# Patient Record
Sex: FEMALE | Race: WHITE | NOT HISPANIC OR LATINO | Employment: FULL TIME | URBAN - METROPOLITAN AREA
[De-identification: names, ages, dates, MRNs, and addresses within clinical notes are randomized per-mention and may not be internally consistent; named-entity substitution may affect disease eponyms.]

---

## 2017-09-11 ENCOUNTER — HOSPITAL ENCOUNTER (OUTPATIENT)
Dept: CARDIOLOGY | Facility: MEDICAL CENTER | Age: 57
End: 2017-09-11
Attending: SPECIALIST
Payer: COMMERCIAL

## 2017-09-11 DIAGNOSIS — I77.810 AORTIC ROOT DILATION (HCC): ICD-10-CM

## 2017-09-11 DIAGNOSIS — Q87.40 MARFANOID INTELLECTUAL DISABILITY SYNDROME: ICD-10-CM

## 2017-09-11 DIAGNOSIS — F78.A9 MARFANOID INTELLECTUAL DISABILITY SYNDROME: ICD-10-CM

## 2017-09-11 LAB
LV EJECT FRACT  99904: 65
LV EJECT FRACT MOD 2C 99903: 75.19
LV EJECT FRACT MOD 4C 99902: 92.94
LV EJECT FRACT MOD BP 99901: 86.88

## 2017-09-11 PROCEDURE — 93306 TTE W/DOPPLER COMPLETE: CPT

## 2017-09-11 PROCEDURE — 93306 TTE W/DOPPLER COMPLETE: CPT | Mod: 26 | Performed by: INTERNAL MEDICINE

## 2017-11-20 ENCOUNTER — HOSPITAL ENCOUNTER (OUTPATIENT)
Dept: RADIOLOGY | Facility: MEDICAL CENTER | Age: 57
End: 2017-11-20
Attending: NURSE PRACTITIONER
Payer: COMMERCIAL

## 2017-11-20 DIAGNOSIS — R91.8 PULMONARY NODULES: ICD-10-CM

## 2017-11-20 PROCEDURE — 71250 CT THORAX DX C-: CPT

## 2018-01-22 ENCOUNTER — APPOINTMENT (OUTPATIENT)
Dept: PULMONOLOGY | Facility: HOSPICE | Age: 58
End: 2018-01-22
Payer: COMMERCIAL

## 2018-03-10 ENCOUNTER — HOSPITAL ENCOUNTER (OUTPATIENT)
Dept: RADIOLOGY | Facility: MEDICAL CENTER | Age: 58
End: 2018-03-10
Attending: FAMILY MEDICINE
Payer: COMMERCIAL

## 2018-03-10 ENCOUNTER — OFFICE VISIT (OUTPATIENT)
Dept: URGENT CARE | Facility: PHYSICIAN GROUP | Age: 58
End: 2018-03-10
Payer: COMMERCIAL

## 2018-03-10 VITALS
HEART RATE: 86 BPM | BODY MASS INDEX: 21.22 KG/M2 | OXYGEN SATURATION: 98 % | HEIGHT: 68 IN | DIASTOLIC BLOOD PRESSURE: 86 MMHG | WEIGHT: 140 LBS | SYSTOLIC BLOOD PRESSURE: 132 MMHG

## 2018-03-10 DIAGNOSIS — S92.355A CLOSED NONDISPLACED FRACTURE OF FIFTH METATARSAL BONE OF LEFT FOOT, INITIAL ENCOUNTER: ICD-10-CM

## 2018-03-10 DIAGNOSIS — S99.922A FOOT INJURY, LEFT, INITIAL ENCOUNTER: ICD-10-CM

## 2018-03-10 PROCEDURE — 99202 OFFICE O/P NEW SF 15 MIN: CPT | Performed by: FAMILY MEDICINE

## 2018-03-10 PROCEDURE — 73630 X-RAY EXAM OF FOOT: CPT | Mod: LT

## 2018-03-10 NOTE — PROGRESS NOTES
"Subjective:      Carolina Grider is a 57 y.o. female who presents with Ankle Injury (left ankle)            Onset yesterday inversion injury left foot with pain lateral aspect. Unable to bear weight on fore and midfoot. No prior injury. No ankle pain. Min relief with OTC nsaid and ice. No other aggravating or alleviating factors.          Review of Systems   Musculoskeletal: Negative for joint pain.   Skin:        No abrasion or laceration     Neurological: Negative for sensory change and focal weakness.          Objective:     /86   Pulse 86   Ht 1.727 m (5' 8\")   Wt 63.5 kg (140 lb)   SpO2 98%   BMI 21.29 kg/m²      Physical Exam   Constitutional: She appears well-developed and well-nourished. No distress.   HENT:   Head: Normocephalic and atraumatic.   Musculoskeletal:   Left foot: tender 5th MT without deformity. Skin intact.   Distal neuro/vascular intact.      Skin: Skin is warm and dry. No rash noted.               Assessment/Plan:     Xray: proximal L 5th MT fracture, xray discussed with radiology.     1. Foot injury, left, initial encounter  DX-FOOT-COMPLETE 3+ LEFT   2. Closed nondisplaced fracture of fifth metatarsal bone of left foot, initial encounter  REFERRAL TO SPORTS MEDICINE     Differential diagnosis, natural history, supportive care, and indications for immediate follow-up discussed at length.     Walking boot  Continue ice/nsaid as needed  "

## 2018-03-13 ENCOUNTER — OFFICE VISIT (OUTPATIENT)
Dept: MEDICAL GROUP | Facility: CLINIC | Age: 58
End: 2018-03-13
Payer: COMMERCIAL

## 2018-03-13 VITALS
TEMPERATURE: 98.4 F | SYSTOLIC BLOOD PRESSURE: 126 MMHG | BODY MASS INDEX: 21.22 KG/M2 | RESPIRATION RATE: 18 BRPM | HEIGHT: 68 IN | HEART RATE: 90 BPM | OXYGEN SATURATION: 98 % | WEIGHT: 140 LBS | DIASTOLIC BLOOD PRESSURE: 84 MMHG

## 2018-03-13 DIAGNOSIS — M79.672 LEFT FOOT PAIN: ICD-10-CM

## 2018-03-13 DIAGNOSIS — S92.355A NONDISPLACED FRACTURE OF FIFTH METATARSAL BONE, LEFT FOOT, INITIAL ENCOUNTER FOR CLOSED FRACTURE: ICD-10-CM

## 2018-03-13 PROCEDURE — 99202 OFFICE O/P NEW SF 15 MIN: CPT | Mod: 25 | Performed by: FAMILY MEDICINE

## 2018-03-13 PROCEDURE — 28470 CLTX METATARSAL FX WO MNP EA: CPT | Mod: LT | Performed by: FAMILY MEDICINE

## 2018-03-13 ASSESSMENT — ENCOUNTER SYMPTOMS
FEVER: 0
NAUSEA: 1
HEADACHES: 0
VOMITING: 0
DIZZINESS: 0
SHORTNESS OF BREATH: 0
CHILLS: 0

## 2018-03-13 NOTE — PROGRESS NOTES
Subjective:      Carolina Grider is a 57 y.o. female who presents with Foot Problem (Referral from / L foot toe injury )      Referred by Marques Campo M.D. for evaluation of LEFT lateral foot pain    HPI   LEFT lateral foot pain  Date of injury March 10, 2018  Tripped on vacuum cord, causing her to stumble and hyperflexed the LEFT foot   She was wearing gym shoes at the time  Felt a snap, heard a snap  Sudden sharp pain at the lateral LEFT forefoot    No radiation  Sharp with certain movements  Improved with rest/immobilization  Taking Tylenol for pain which is helping  Seen in urgent care, had x-rays and placed in a short cam walker boot  Denies any prior issues with her LEFT foot    Review of Systems   Constitutional: Negative for chills and fever.   Respiratory: Negative for shortness of breath.    Cardiovascular: Negative for chest pain.   Gastrointestinal: Positive for nausea. Negative for vomiting.        After the injury, due to pain, still some nausea this a.m.   Neurological: Negative for dizziness and headaches.     PMH:  has a past medical history of Bronchitis; Fibromyalgia; and Pneumonia.  MEDS:   Current Outpatient Prescriptions:   •  azithromycin (ZITHROMAX) 250 MG Tab, Take as directed, Disp: 6 Tab, Rfl: 0  •  ertapenem (INVANZ) 1 GM Recon Soln, 1,000 mg by Intravenous route., Disp: , Rfl:   •  lactobacillus granules (LACTINEX/FLORANEX) Pack, Take 1 Packet by mouth 2 Times a Day., Disp: 60 Packet, Rfl: 3  •  meloxicam (MOBIC) 15 MG tablet, Take 15 mg by mouth every bedtime., Disp: , Rfl:   •  cyclobenzaprine (FLEXERIL) 10 MG TABS, Take 10 mg by mouth every bedtime., Disp: , Rfl:   ALLERGIES:   Allergies   Allergen Reactions   • Codeine Hives, Itching and Swelling     SURGHX:   Past Surgical History:   Procedure Laterality Date   • HYSTERECTOMY, VAGINAL     • PB C-SEC ONLY,PBEV C-SEC       SOCHX:  reports that she has never smoked. She has never used smokeless tobacco. She reports that she drinks  "about 0.6 - 1.8 oz of alcohol per week . She reports that she does not use drugs.  FH: Family history was reviewed, no pertinent findings to report       Objective:     /84   Pulse 90   Temp 36.9 °C (98.4 °F)   Resp 18   Ht 1.727 m (5' 8\")   Wt 63.5 kg (140 lb)   SpO2 98%   BMI 21.29 kg/m²       Physical Exam      RIGHT ANKLE:  There is NO swelling noted at the ankle  Range of motion intact with dorsiflexion and plantarflexion, inversion and eversion  There is NO tenderness of the ATFL, CF or PT of ligament  There is NO tenderness of the lateral malleolus or medial malleolus  Anterior drawer testing is NEGATIVE  Talar tilt testing is NEGATIVE  The foot and ankle is otherwise neurovascularly intact    RIGHT FOOT:  There is NO swelling noted at the foot  There is NO tenderness at the base of the fifth metatarsal, cuboid, or tarsal navicular  There is NO pain with metatarsal squeeze test    LEFT ANKLE:  There is NO swelling noted at the ankle  Range of motion intact with dorsiflexion and plantarflexion, inversion and eversion  There is NO tenderness of the ATFL, CF or PT of ligament  There is NO tenderness of the lateral malleolus or medial malleolus  Anterior drawer testing is NEGATIVE  Talar tilt testing is NEGATIVE  The foot and ankle is otherwise neurovascularly intact    LEFT FOOT:  There is POSITIVE MILD to moderate swelling noted at the foot  There is POSITIVE tenderness at the base of the fifth metatarsal, without tenderness of the cuboid or tarsal navicular  There is NO pain with metatarsal squeeze test  POSITIVE tenderness at the proximal phalanx of the fourth toe and distal fourth metatarsal at the dorsal aspect    NEUTRAL stance  Able to ambulate with ANTALGIC gait       Assessment/Plan:     1. Nondisplaced fracture of fifth metatarsal bone, left foot, initial encounter for closed fracture     2. Left foot pain      forefoot at 4th metatarsal head and proximal phalanx of the 4th toe "     Continue fracture immobilization in Cam Walker boot  follow-up in 2 weeks for reevaluation prior to her trip to [place]    The plan is to continue Cam Walker boot for a total of 4-6 weeks (around April 17, 2018)          3/10/2018 2:26 PM    HISTORY/REASON FOR EXAM:  Pain/Deformity Following Trauma  Left foot pain, injury    TECHNIQUE/EXAM DESCRIPTION AND NUMBER OF VIEWS:  3 views of the LEFT foot.    COMPARISON:  None.    FINDINGS:    There is fracture of the second proximal phalanx although this could be old.    There is no malalignment.    Midfoot and forefoot degenerative changes are present.    There is an accessory navicular, anatomic variant   Impression       1.  Second proximal phalanx fracture, age indeterminate    2.  Midfoot and forefoot osteoarthritis     Interpreted in the office today with the patient  Nondisplaced base of fifth metatarsal fracture not mentioned on the official report    Thank you Marques Campo M.D. for allowing me to participate in caring for your patient.

## 2018-03-21 ASSESSMENT — ENCOUNTER SYMPTOMS
ROS SKIN COMMENTS: NO ABRASION OR LACERATION
FOCAL WEAKNESS: 0
SENSORY CHANGE: 0

## 2018-03-26 ENCOUNTER — OFFICE VISIT (OUTPATIENT)
Dept: MEDICAL GROUP | Facility: CLINIC | Age: 58
End: 2018-03-26
Payer: COMMERCIAL

## 2018-03-26 VITALS
RESPIRATION RATE: 18 BRPM | DIASTOLIC BLOOD PRESSURE: 82 MMHG | SYSTOLIC BLOOD PRESSURE: 122 MMHG | TEMPERATURE: 98.2 F | OXYGEN SATURATION: 98 % | HEART RATE: 82 BPM | HEIGHT: 68 IN | BODY MASS INDEX: 21.22 KG/M2 | WEIGHT: 140 LBS

## 2018-03-26 DIAGNOSIS — S92.352A: ICD-10-CM

## 2018-03-26 PROCEDURE — 99024 POSTOP FOLLOW-UP VISIT: CPT | Performed by: FAMILY MEDICINE

## 2018-03-26 ASSESSMENT — ENCOUNTER SYMPTOMS
SHORTNESS OF BREATH: 0
HEADACHES: 0
FEVER: 0
DIZZINESS: 0
NAUSEA: 1
VOMITING: 0
CHILLS: 0

## 2018-03-26 NOTE — LETTER
March 26, 2018         Patient: Carolina Grider   YOB: 1960   Date of Visit: 3/26/2018           To Whom it May Concern:    Carolina Grider was seen in my clinic on 3/26/2018. She is cleared for travel without restriction with her orthopedic boot.    If you have any questions or concerns, please don't hesitate to call.        Sincerely,           Hector Alatorre M.D.  Electronically Signed

## 2018-03-26 NOTE — PROGRESS NOTES
Subjective:      Carolina Grider is a 57 y.o. female who presents with Toe Injury (F/V L foot toe injury )      FOLLOW up for LEFT base of 5th metatarsal fracture    Toe Injury   Associated symptoms include nausea. Pertinent negatives include no chest pain, chills, fever, headaches or vomiting.      LEFT base of 5th metatarsal fracture  ZONE 1   Date of injury March 10, 2018  Tripped on vacuum cord, causing her to stumble and hyperflexed the LEFT foot   She was wearing gym shoes at the time  Felt a snap, heard a snap  Occasional PAIN with mis-steps    Review of Systems   Constitutional: Negative for chills and fever.   Respiratory: Negative for shortness of breath.    Cardiovascular: Negative for chest pain.   Gastrointestinal: Positive for nausea. Negative for vomiting.        After the injury, due to pain, still some nausea this a.m.   Neurological: Negative for dizziness and headaches.     PMH:  has a past medical history of Bronchitis; Fibromyalgia; and Pneumonia.  MEDS:   Current Outpatient Prescriptions:   •  azithromycin (ZITHROMAX) 250 MG Tab, Take as directed, Disp: 6 Tab, Rfl: 0  •  ertapenem (INVANZ) 1 GM Recon Soln, 1,000 mg by Intravenous route., Disp: , Rfl:   •  lactobacillus granules (LACTINEX/FLORANEX) Pack, Take 1 Packet by mouth 2 Times a Day., Disp: 60 Packet, Rfl: 3  •  meloxicam (MOBIC) 15 MG tablet, Take 15 mg by mouth every bedtime., Disp: , Rfl:   •  cyclobenzaprine (FLEXERIL) 10 MG TABS, Take 10 mg by mouth every bedtime., Disp: , Rfl:   ALLERGIES:   Allergies   Allergen Reactions   • Codeine Hives, Itching and Swelling     SURGHX:   Past Surgical History:   Procedure Laterality Date   • HYSTERECTOMY, VAGINAL     • PB C-SEC ONLY,PBEV C-SEC       SOCHX:  reports that she has never smoked. She has never used smokeless tobacco. She reports that she drinks about 0.6 - 1.8 oz of alcohol per week . She reports that she does not use drugs.  FH: Family history was reviewed, no pertinent findings to  "report       Objective:     /82   Pulse 82   Temp 36.8 °C (98.2 °F)   Resp 18   Ht 1.727 m (5' 8\")   Wt 63.5 kg (140 lb)   SpO2 98%   BMI 21.29 kg/m²      Physical Exam        LEFT FOOT:  There is NO swelling noted at the foot  There is MINIMAL tenderness at the base of the fifth metatarsal, without tenderness of the cuboid or tarsal navicular  There is NO pain with metatarsal squeeze test  MINIMAL tenderness at the proximal phalanx of the fourth toe and distal fourth metatarsal at the dorsal aspect    NEUTRAL stance  Able to ambulate with NORMAL gait       Assessment/Plan:     1. Closed metaphyseal fracture of fifth metatarsal bone of left foot        Continue fracture immobilization in Cam Walker boot  follow-up in 2 weeks upon returning from her trip to [place]    The plan is to continue Cam Walker boot for a total of 4-6 weeks (around April 17, 2018)          3/10/2018 2:26 PM    HISTORY/REASON FOR EXAM:  Pain/Deformity Following Trauma  Left foot pain, injury    TECHNIQUE/EXAM DESCRIPTION AND NUMBER OF VIEWS:  3 views of the LEFT foot.    COMPARISON:  None.    FINDINGS:    There is fracture of the second proximal phalanx although this could be old.    There is no malalignment.    Midfoot and forefoot degenerative changes are present.    There is an accessory navicular, anatomic variant   Impression       1.  Second proximal phalanx fracture, age indeterminate    2.  Midfoot and forefoot osteoarthritis     Nondisplaced base of fifth metatarsal fracture not mentioned on the official report    Thank you Marques Campo M.D. for allowing me to participate in caring for your patient.    "

## 2018-04-09 ENCOUNTER — OFFICE VISIT (OUTPATIENT)
Dept: MEDICAL GROUP | Facility: CLINIC | Age: 58
End: 2018-04-09
Payer: COMMERCIAL

## 2018-04-09 VITALS
HEART RATE: 90 BPM | BODY MASS INDEX: 21.22 KG/M2 | DIASTOLIC BLOOD PRESSURE: 80 MMHG | RESPIRATION RATE: 18 BRPM | SYSTOLIC BLOOD PRESSURE: 122 MMHG | TEMPERATURE: 99.2 F | OXYGEN SATURATION: 99 % | HEIGHT: 68 IN | WEIGHT: 140 LBS

## 2018-04-09 DIAGNOSIS — S92.352A: ICD-10-CM

## 2018-04-09 PROCEDURE — 99024 POSTOP FOLLOW-UP VISIT: CPT | Performed by: FAMILY MEDICINE

## 2018-04-09 ASSESSMENT — ENCOUNTER SYMPTOMS
VOMITING: 0
CHILLS: 0
FEVER: 0
NAUSEA: 1
DIZZINESS: 0
HEADACHES: 0
SHORTNESS OF BREATH: 0

## 2018-04-09 NOTE — PROGRESS NOTES
Subjective:      Carolina Grider is a 57 y.o. female who presents with Foot Problem (F/V L foot toe injury )      FOLLOW up for LEFT base of 5th metatarsal fracture    Toe Injury   Associated symptoms include nausea. Pertinent negatives include no chest pain, chills, fever, headaches or vomiting.   Foot Problem   Associated symptoms include nausea. Pertinent negatives include no chest pain, chills, fever, headaches or vomiting.      LEFT base of 5th metatarsal fracture  ZONE 1   Date of injury March 10, 2018  Tripped on vacuum cord, causing her to stumble and hyperflexed the LEFT foot   She was wearing gym shoes at the time  Felt a snap, heard a snap    NO PAIN in boot    Review of Systems   Constitutional: Negative for chills and fever.   Respiratory: Negative for shortness of breath.    Cardiovascular: Negative for chest pain.   Gastrointestinal: Positive for nausea. Negative for vomiting.        After the injury, due to pain, still some nausea this a.m.   Neurological: Negative for dizziness and headaches.     PMH:  has a past medical history of Bronchitis; Fibromyalgia; and Pneumonia.  MEDS:   Current Outpatient Prescriptions:   •  azithromycin (ZITHROMAX) 250 MG Tab, Take as directed, Disp: 6 Tab, Rfl: 0  •  ertapenem (INVANZ) 1 GM Recon Soln, 1,000 mg by Intravenous route., Disp: , Rfl:   •  lactobacillus granules (LACTINEX/FLORANEX) Pack, Take 1 Packet by mouth 2 Times a Day., Disp: 60 Packet, Rfl: 3  •  meloxicam (MOBIC) 15 MG tablet, Take 15 mg by mouth every bedtime., Disp: , Rfl:   •  cyclobenzaprine (FLEXERIL) 10 MG TABS, Take 10 mg by mouth every bedtime., Disp: , Rfl:   ALLERGIES:   Allergies   Allergen Reactions   • Codeine Hives, Itching and Swelling     SURGHX:   Past Surgical History:   Procedure Laterality Date   • HYSTERECTOMY, VAGINAL     • PB C-SEC ONLY,PBEV C-SEC       SOCHX:  reports that she has never smoked. She has never used smokeless tobacco. She reports that she drinks about 0.6 - 1.8 oz  "of alcohol per week . She reports that she does not use drugs.  FH: Family history was reviewed, no pertinent findings to report       Objective:     /80   Pulse 90   Temp 37.3 °C (99.2 °F)   Resp 18   Ht 1.727 m (5' 8\")   Wt 63.5 kg (140 lb)   SpO2 99%   BMI 21.29 kg/m²      Physical Exam        LEFT FOOT:  There is NO swelling noted at the foot  There is MINIMAL tenderness at the base of the fifth metatarsal, without tenderness of the cuboid or tarsal navicular  There is NO pain with metatarsal squeeze test    NEUTRAL stance  Able to ambulate with NORMAL gait       Assessment/Plan:     1. Closed metaphyseal fracture of fifth metatarsal bone of left foot        Continue fracture immobilization in Cam Walker boot    The plan is to continue Cam Walker boot for a total of 4-6 weeks (around April 17, 2018)    Return in about 10 days (around 4/19/2018). she will be out of the boot for a few days at that point. Concerned about having to wear dress shoes for work        3/10/2018 2:26 PM    HISTORY/REASON FOR EXAM:  Pain/Deformity Following Trauma  Left foot pain, injury    TECHNIQUE/EXAM DESCRIPTION AND NUMBER OF VIEWS:  3 views of the LEFT foot.    COMPARISON:  None.    FINDINGS:    There is fracture of the second proximal phalanx although this could be old.    There is no malalignment.    Midfoot and forefoot degenerative changes are present.    There is an accessory navicular, anatomic variant   Impression       1.  Second proximal phalanx fracture, age indeterminate    2.  Midfoot and forefoot osteoarthritis     Nondisplaced base of fifth metatarsal fracture not mentioned on the official report    Thank you Marques Campo M.D. for allowing me to participate in caring for your patient.    "

## 2018-06-18 ENCOUNTER — OFFICE VISIT (OUTPATIENT)
Dept: PULMONOLOGY | Facility: HOSPICE | Age: 58
End: 2018-06-18
Payer: COMMERCIAL

## 2018-06-18 VITALS
SYSTOLIC BLOOD PRESSURE: 110 MMHG | BODY MASS INDEX: 21.82 KG/M2 | TEMPERATURE: 98.2 F | OXYGEN SATURATION: 98 % | RESPIRATION RATE: 16 BRPM | DIASTOLIC BLOOD PRESSURE: 72 MMHG | HEART RATE: 87 BPM | HEIGHT: 68 IN | WEIGHT: 144 LBS

## 2018-06-18 DIAGNOSIS — R91.8 PULMONARY NODULES: ICD-10-CM

## 2018-06-18 DIAGNOSIS — Z87.01 HISTORY OF PNEUMONIA: ICD-10-CM

## 2018-06-18 PROCEDURE — 99213 OFFICE O/P EST LOW 20 MIN: CPT | Performed by: NURSE PRACTITIONER

## 2018-06-18 NOTE — PROGRESS NOTES
Chief Complaint   Patient presents with   • Pulmonary Nodule     Ct Results         HPI: This patient is a 57 y.o. female, who presents for CT results, follow-up pulmonary nodule.  She was last seen December 2016.    To reiterate, She has a history of fibromyalgia, otherwise history is unremarkable. She is a never smoker. Former PFTs are normal. She was hospitalized December 2015 for right lower lung abscess and community-acquired pneumonia. She has had serial CTs since that time to follow incidental pulmonary nodules.  CT chest November 20, 2017 compared back to December 2015 shows right lower lobe scarring similar in appearance to prior exam, stable right lower lobe nodule measuring 4 mm in diameter.  She denies any pulmonary complaints including cough, dyspnea, wheeze, fever, chills, night sweats, unintentional weight loss.  She denies changes to her health since she was last seen.    Past Medical History:   Diagnosis Date   • Bronchitis    • Fibromyalgia    • Pneumonia        Social History   Substance Use Topics   • Smoking status: Never Smoker   • Smokeless tobacco: Never Used   • Alcohol use 0.6 - 1.8 oz/week     1 - 3 Standard drinks or equivalent per week      Comment: occ       Family History   Problem Relation Age of Onset   • Heart Disease Mother        Current medications as of today   Current Outpatient Prescriptions   Medication Sig Dispense Refill   • meloxicam (MOBIC) 15 MG tablet Take 15 mg by mouth every bedtime.     • cyclobenzaprine (FLEXERIL) 10 MG TABS Take 10 mg by mouth every bedtime.     • azithromycin (ZITHROMAX) 250 MG Tab Take as directed 6 Tab 0   • ertapenem (INVANZ) 1 GM Recon Soln 1,000 mg by Intravenous route.     • lactobacillus granules (LACTINEX/FLORANEX) Pack Take 1 Packet by mouth 2 Times a Day. 60 Packet 3     No current facility-administered medications for this visit.        Allergies: Codeine    Blood pressure 110/72, pulse 87, temperature 36.8 °C (98.2 °F), resp. rate 16,  "height 1.727 m (5' 8\"), weight 65.3 kg (144 lb), SpO2 98 %.      ROS: As per HPI and otherwise negative if not stated.    Physical exam:   Constitutional: Well-nourished, well-developed, in no acute distress  Eyes: PERRL  Neck: supple, trachea midline  Respiratory: no intercostal retractions or accessory muscle use   Lungs auscultation: Clear to auscultation bilaterally, no evidence of crackles or free  Cardiovascular: Regular rate rhythm no murmurs, rubs or gallops  Musculoskeletal:  no clubbing or cyanosis  Skin: No rashes or lesions noted on exposed skin  Neuro: No focal deficit noted  Psychiatric: Oriented to time, person and place.     Diagnosis:  1. Pulmonary nodules-stable x 2 years     2. History of pneumonia         Plan:  1.  Patient had incidental finding of a 4 mm pulmonary nodule in the right lower lobe stable for 2 years.  She is a non-smoker.  Denies pulmonary complaints.  No further radiographic follow-up is necessary at this point.   in agreement.   2.  She has no history of lung disease, she will follow-up with her PCP for routine health maintenance  3.  She does not need to follow-up at our pulmonary clinic unless new symptoms warrant      "

## 2018-08-03 ENCOUNTER — APPOINTMENT (OUTPATIENT)
Dept: DERMATOLOGY | Facility: IMAGING CENTER | Age: 58
End: 2018-08-03

## 2018-10-29 ENCOUNTER — HOSPITAL ENCOUNTER (OUTPATIENT)
Dept: LAB | Facility: MEDICAL CENTER | Age: 58
End: 2018-10-29
Attending: SPECIALIST
Payer: COMMERCIAL

## 2018-10-29 ENCOUNTER — HOSPITAL ENCOUNTER (OUTPATIENT)
Dept: LAB | Facility: MEDICAL CENTER | Age: 58
End: 2018-10-29
Attending: FAMILY MEDICINE
Payer: COMMERCIAL

## 2018-10-29 LAB
25(OH)D3 SERPL-MCNC: 32 NG/ML (ref 30–100)
ALBUMIN SERPL BCP-MCNC: 4.5 G/DL (ref 3.2–4.9)
ALBUMIN SERPL BCP-MCNC: 4.6 G/DL (ref 3.2–4.9)
ALBUMIN/GLOB SERPL: 1.6 G/DL
ALBUMIN/GLOB SERPL: 1.8 G/DL
ALP SERPL-CCNC: 48 U/L (ref 30–99)
ALP SERPL-CCNC: 49 U/L (ref 30–99)
ALT SERPL-CCNC: 16 U/L (ref 2–50)
ALT SERPL-CCNC: 16 U/L (ref 2–50)
ANION GAP SERPL CALC-SCNC: 9 MMOL/L (ref 0–11.9)
ANION GAP SERPL CALC-SCNC: 9 MMOL/L (ref 0–11.9)
AST SERPL-CCNC: 12 U/L (ref 12–45)
AST SERPL-CCNC: 14 U/L (ref 12–45)
BASOPHILS # BLD AUTO: 1.5 % (ref 0–1.8)
BASOPHILS # BLD: 0.08 K/UL (ref 0–0.12)
BILIRUB SERPL-MCNC: 0.4 MG/DL (ref 0.1–1.5)
BILIRUB SERPL-MCNC: 0.4 MG/DL (ref 0.1–1.5)
BUN SERPL-MCNC: 23 MG/DL (ref 8–22)
BUN SERPL-MCNC: 24 MG/DL (ref 8–22)
CALCIUM SERPL-MCNC: 9.3 MG/DL (ref 8.5–10.5)
CALCIUM SERPL-MCNC: 9.3 MG/DL (ref 8.5–10.5)
CHLORIDE SERPL-SCNC: 108 MMOL/L (ref 96–112)
CHLORIDE SERPL-SCNC: 108 MMOL/L (ref 96–112)
CHOLEST SERPL-MCNC: 260 MG/DL (ref 100–199)
CO2 SERPL-SCNC: 23 MMOL/L (ref 20–33)
CO2 SERPL-SCNC: 23 MMOL/L (ref 20–33)
CREAT SERPL-MCNC: 0.78 MG/DL (ref 0.5–1.4)
CREAT SERPL-MCNC: 0.81 MG/DL (ref 0.5–1.4)
EOSINOPHIL # BLD AUTO: 0.07 K/UL (ref 0–0.51)
EOSINOPHIL NFR BLD: 1.3 % (ref 0–6.9)
ERYTHROCYTE [DISTWIDTH] IN BLOOD BY AUTOMATED COUNT: 42.7 FL (ref 35.9–50)
FASTING STATUS PATIENT QL REPORTED: NORMAL
FASTING STATUS PATIENT QL REPORTED: NORMAL
GLOBULIN SER CALC-MCNC: 2.6 G/DL (ref 1.9–3.5)
GLOBULIN SER CALC-MCNC: 2.8 G/DL (ref 1.9–3.5)
GLUCOSE SERPL-MCNC: 102 MG/DL (ref 65–99)
GLUCOSE SERPL-MCNC: 94 MG/DL (ref 65–99)
HCT VFR BLD AUTO: 45 % (ref 37–47)
HDLC SERPL-MCNC: 69 MG/DL
HGB BLD-MCNC: 14.5 G/DL (ref 12–16)
IMM GRANULOCYTES # BLD AUTO: 0.02 K/UL (ref 0–0.11)
IMM GRANULOCYTES NFR BLD AUTO: 0.4 % (ref 0–0.9)
LDLC SERPL CALC-MCNC: 160 MG/DL
LYMPHOCYTES # BLD AUTO: 1.47 K/UL (ref 1–4.8)
LYMPHOCYTES NFR BLD: 27.1 % (ref 22–41)
MCH RBC QN AUTO: 29.9 PG (ref 27–33)
MCHC RBC AUTO-ENTMCNC: 32.2 G/DL (ref 33.6–35)
MCV RBC AUTO: 92.8 FL (ref 81.4–97.8)
MONOCYTES # BLD AUTO: 0.4 K/UL (ref 0–0.85)
MONOCYTES NFR BLD AUTO: 7.4 % (ref 0–13.4)
NEUTROPHILS # BLD AUTO: 3.39 K/UL (ref 2–7.15)
NEUTROPHILS NFR BLD: 62.3 % (ref 44–72)
NRBC # BLD AUTO: 0 K/UL
NRBC BLD-RTO: 0 /100 WBC
PLATELET # BLD AUTO: 193 K/UL (ref 164–446)
PMV BLD AUTO: 10.3 FL (ref 9–12.9)
POTASSIUM SERPL-SCNC: 4 MMOL/L (ref 3.6–5.5)
POTASSIUM SERPL-SCNC: 4.1 MMOL/L (ref 3.6–5.5)
PROT SERPL-MCNC: 7.2 G/DL (ref 6–8.2)
PROT SERPL-MCNC: 7.3 G/DL (ref 6–8.2)
RBC # BLD AUTO: 4.85 M/UL (ref 4.2–5.4)
SODIUM SERPL-SCNC: 140 MMOL/L (ref 135–145)
SODIUM SERPL-SCNC: 140 MMOL/L (ref 135–145)
TRIGL SERPL-MCNC: 153 MG/DL (ref 0–149)
WBC # BLD AUTO: 5.4 K/UL (ref 4.8–10.8)

## 2018-10-29 PROCEDURE — 36415 COLL VENOUS BLD VENIPUNCTURE: CPT

## 2018-10-29 PROCEDURE — 80053 COMPREHEN METABOLIC PANEL: CPT

## 2018-10-29 PROCEDURE — 82306 VITAMIN D 25 HYDROXY: CPT

## 2018-10-29 PROCEDURE — 80061 LIPID PANEL: CPT

## 2018-10-29 PROCEDURE — 80053 COMPREHEN METABOLIC PANEL: CPT | Mod: 91

## 2018-10-29 PROCEDURE — 85025 COMPLETE CBC W/AUTO DIFF WBC: CPT

## 2019-04-29 ENCOUNTER — APPOINTMENT (OUTPATIENT)
Dept: DERMATOLOGY | Facility: IMAGING CENTER | Age: 59
End: 2019-04-29

## 2020-08-31 ENCOUNTER — HOSPITAL ENCOUNTER (OUTPATIENT)
Dept: LAB | Facility: MEDICAL CENTER | Age: 60
End: 2020-08-31
Attending: SPECIALIST
Payer: COMMERCIAL

## 2020-08-31 LAB
25(OH)D3 SERPL-MCNC: 33 NG/ML (ref 30–100)
ALBUMIN SERPL BCP-MCNC: 4.6 G/DL (ref 3.2–4.9)
ALBUMIN/GLOB SERPL: 2 G/DL
ALP SERPL-CCNC: 52 U/L (ref 30–99)
ALT SERPL-CCNC: 18 U/L (ref 2–50)
ANION GAP SERPL CALC-SCNC: 12 MMOL/L (ref 7–16)
AST SERPL-CCNC: 11 U/L (ref 12–45)
BASOPHILS # BLD AUTO: 1.4 % (ref 0–1.8)
BASOPHILS # BLD: 0.07 K/UL (ref 0–0.12)
BILIRUB SERPL-MCNC: 0.3 MG/DL (ref 0.1–1.5)
BUN SERPL-MCNC: 23 MG/DL (ref 8–22)
CALCIUM SERPL-MCNC: 9.2 MG/DL (ref 8.5–10.5)
CHLORIDE SERPL-SCNC: 103 MMOL/L (ref 96–112)
CO2 SERPL-SCNC: 24 MMOL/L (ref 20–33)
CREAT SERPL-MCNC: 0.63 MG/DL (ref 0.5–1.4)
EOSINOPHIL # BLD AUTO: 0.08 K/UL (ref 0–0.51)
EOSINOPHIL NFR BLD: 1.6 % (ref 0–6.9)
ERYTHROCYTE [DISTWIDTH] IN BLOOD BY AUTOMATED COUNT: 42.8 FL (ref 35.9–50)
FASTING STATUS PATIENT QL REPORTED: NORMAL
GLOBULIN SER CALC-MCNC: 2.3 G/DL (ref 1.9–3.5)
GLUCOSE SERPL-MCNC: 102 MG/DL (ref 65–99)
HCT VFR BLD AUTO: 46 % (ref 37–47)
HGB BLD-MCNC: 15.1 G/DL (ref 12–16)
IMM GRANULOCYTES # BLD AUTO: 0.02 K/UL (ref 0–0.11)
IMM GRANULOCYTES NFR BLD AUTO: 0.4 % (ref 0–0.9)
LYMPHOCYTES # BLD AUTO: 2.05 K/UL (ref 1–4.8)
LYMPHOCYTES NFR BLD: 39.7 % (ref 22–41)
MCH RBC QN AUTO: 30.7 PG (ref 27–33)
MCHC RBC AUTO-ENTMCNC: 32.8 G/DL (ref 33.6–35)
MCV RBC AUTO: 93.5 FL (ref 81.4–97.8)
MONOCYTES # BLD AUTO: 0.38 K/UL (ref 0–0.85)
MONOCYTES NFR BLD AUTO: 7.4 % (ref 0–13.4)
NEUTROPHILS # BLD AUTO: 2.56 K/UL (ref 2–7.15)
NEUTROPHILS NFR BLD: 49.5 % (ref 44–72)
NRBC # BLD AUTO: 0 K/UL
NRBC BLD-RTO: 0 /100 WBC
PLATELET # BLD AUTO: 213 K/UL (ref 164–446)
PMV BLD AUTO: 10.5 FL (ref 9–12.9)
POTASSIUM SERPL-SCNC: 4.6 MMOL/L (ref 3.6–5.5)
PROT SERPL-MCNC: 6.9 G/DL (ref 6–8.2)
RBC # BLD AUTO: 4.92 M/UL (ref 4.2–5.4)
SODIUM SERPL-SCNC: 139 MMOL/L (ref 135–145)
WBC # BLD AUTO: 5.2 K/UL (ref 4.8–10.8)

## 2020-08-31 PROCEDURE — 80053 COMPREHEN METABOLIC PANEL: CPT

## 2020-08-31 PROCEDURE — 82306 VITAMIN D 25 HYDROXY: CPT

## 2020-08-31 PROCEDURE — 85025 COMPLETE CBC W/AUTO DIFF WBC: CPT

## 2020-08-31 PROCEDURE — 36415 COLL VENOUS BLD VENIPUNCTURE: CPT

## 2020-10-06 ENCOUNTER — PHARMACY VISIT (OUTPATIENT)
Dept: PHARMACY | Facility: MEDICAL CENTER | Age: 60
End: 2020-10-06
Payer: COMMERCIAL

## 2020-10-06 PROCEDURE — RXMED WILLOW AMBULATORY MEDICATION CHARGE: Performed by: INTERNAL MEDICINE

## 2020-10-06 RX ORDER — INFLUENZA A VIRUS A/BRISBANE/02/2018 IVR-190 (H1N1) ANTIGEN (FORMALDEHYDE INACTIVATED), INFLUENZA A VIRUS A/KANSAS/14/2017 X-327 (H3N2) ANTIGEN (FORMALDEHYDE INACTIVATED), INFLUENZA B VIRUS B/PHUKET/3073/2013 ANTIGEN (FORMALDEHYDE INACTIVATED), AND INFLUENZA B VIRUS B/MARYLAND/15/2016 BX-69A ANTIGEN (FORMALDEHYDE INACTIVATED) 15; 15; 15; 15 UG/.5ML; UG/.5ML; UG/.5ML; UG/.5ML
INJECTION, SUSPENSION INTRAMUSCULAR
Qty: 0.5 ML | Refills: 0 | OUTPATIENT
Start: 2020-10-06

## 2020-10-07 ENCOUNTER — DOCUMENTATION (OUTPATIENT)
Dept: PHARMACY | Facility: MEDICAL CENTER | Age: 60
End: 2020-10-07

## 2021-10-07 ENCOUNTER — DOCUMENTATION (OUTPATIENT)
Dept: PHARMACY | Facility: MEDICAL CENTER | Age: 61
End: 2021-10-07

## 2021-10-07 PROCEDURE — RXMED WILLOW AMBULATORY MEDICATION CHARGE: Performed by: INTERNAL MEDICINE

## 2021-10-11 ENCOUNTER — PHARMACY VISIT (OUTPATIENT)
Dept: PHARMACY | Facility: MEDICAL CENTER | Age: 61
End: 2021-10-11
Payer: COMMERCIAL

## 2022-09-26 ENCOUNTER — HOSPITAL ENCOUNTER (OUTPATIENT)
Dept: LAB | Facility: MEDICAL CENTER | Age: 62
End: 2022-09-26
Attending: FAMILY MEDICINE
Payer: COMMERCIAL

## 2022-09-26 LAB
ALBUMIN SERPL BCP-MCNC: 4.4 G/DL (ref 3.2–4.9)
ALBUMIN/GLOB SERPL: 1.8 G/DL
ALP SERPL-CCNC: 54 U/L (ref 30–99)
ALT SERPL-CCNC: 16 U/L (ref 2–50)
ANION GAP SERPL CALC-SCNC: 12 MMOL/L (ref 7–16)
AST SERPL-CCNC: 12 U/L (ref 12–45)
BASOPHILS # BLD AUTO: 1 % (ref 0–1.8)
BASOPHILS # BLD: 0.06 K/UL (ref 0–0.12)
BILIRUB SERPL-MCNC: 0.3 MG/DL (ref 0.1–1.5)
BUN SERPL-MCNC: 19 MG/DL (ref 8–22)
CALCIUM SERPL-MCNC: 9.1 MG/DL (ref 8.5–10.5)
CHLORIDE SERPL-SCNC: 105 MMOL/L (ref 96–112)
CHOLEST SERPL-MCNC: 283 MG/DL (ref 100–199)
CO2 SERPL-SCNC: 22 MMOL/L (ref 20–33)
CREAT SERPL-MCNC: 0.68 MG/DL (ref 0.5–1.4)
EOSINOPHIL # BLD AUTO: 0.09 K/UL (ref 0–0.51)
EOSINOPHIL NFR BLD: 1.6 % (ref 0–6.9)
ERYTHROCYTE [DISTWIDTH] IN BLOOD BY AUTOMATED COUNT: 43.4 FL (ref 35.9–50)
FASTING STATUS PATIENT QL REPORTED: NORMAL
GFR SERPLBLD CREATININE-BSD FMLA CKD-EPI: 99 ML/MIN/1.73 M 2
GLOBULIN SER CALC-MCNC: 2.5 G/DL (ref 1.9–3.5)
GLUCOSE SERPL-MCNC: 99 MG/DL (ref 65–99)
HCT VFR BLD AUTO: 47 % (ref 37–47)
HDLC SERPL-MCNC: 75 MG/DL
HGB BLD-MCNC: 15.3 G/DL (ref 12–16)
IMM GRANULOCYTES # BLD AUTO: 0.02 K/UL (ref 0–0.11)
IMM GRANULOCYTES NFR BLD AUTO: 0.3 % (ref 0–0.9)
LDLC SERPL CALC-MCNC: 170 MG/DL
LYMPHOCYTES # BLD AUTO: 1.63 K/UL (ref 1–4.8)
LYMPHOCYTES NFR BLD: 28.3 % (ref 22–41)
MCH RBC QN AUTO: 30.5 PG (ref 27–33)
MCHC RBC AUTO-ENTMCNC: 32.6 G/DL (ref 33.6–35)
MCV RBC AUTO: 93.6 FL (ref 81.4–97.8)
MONOCYTES # BLD AUTO: 0.4 K/UL (ref 0–0.85)
MONOCYTES NFR BLD AUTO: 7 % (ref 0–13.4)
NEUTROPHILS # BLD AUTO: 3.55 K/UL (ref 2–7.15)
NEUTROPHILS NFR BLD: 61.8 % (ref 44–72)
NRBC # BLD AUTO: 0 K/UL
NRBC BLD-RTO: 0 /100 WBC
PLATELET # BLD AUTO: 217 K/UL (ref 164–446)
PMV BLD AUTO: 10.4 FL (ref 9–12.9)
POTASSIUM SERPL-SCNC: 4.1 MMOL/L (ref 3.6–5.5)
PROT SERPL-MCNC: 6.9 G/DL (ref 6–8.2)
RBC # BLD AUTO: 5.02 M/UL (ref 4.2–5.4)
SODIUM SERPL-SCNC: 139 MMOL/L (ref 135–145)
TRIGL SERPL-MCNC: 189 MG/DL (ref 0–149)
WBC # BLD AUTO: 5.8 K/UL (ref 4.8–10.8)

## 2022-09-26 PROCEDURE — 85025 COMPLETE CBC W/AUTO DIFF WBC: CPT

## 2022-09-26 PROCEDURE — 80053 COMPREHEN METABOLIC PANEL: CPT

## 2022-09-26 PROCEDURE — 36415 COLL VENOUS BLD VENIPUNCTURE: CPT

## 2022-09-26 PROCEDURE — 80061 LIPID PANEL: CPT

## 2022-10-15 ENCOUNTER — PHARMACY VISIT (OUTPATIENT)
Dept: PHARMACY | Facility: MEDICAL CENTER | Age: 62
End: 2022-10-15
Payer: COMMERCIAL

## 2022-10-15 PROCEDURE — RXMED WILLOW AMBULATORY MEDICATION CHARGE: Performed by: INTERNAL MEDICINE

## 2022-10-15 RX ORDER — INFLUENZA A VIRUS A/BRISBANE/02/2018 IVR-190 (H1N1) ANTIGEN (FORMALDEHYDE INACTIVATED), INFLUENZA A VIRUS A/KANSAS/14/2017 X-327 (H3N2) ANTIGEN (FORMALDEHYDE INACTIVATED), INFLUENZA B VIRUS B/PHUKET/3073/2013 ANTIGEN (FORMALDEHYDE INACTIVATED), AND INFLUENZA B VIRUS B/MARYLAND/15/2016 BX-69A ANTIGEN (FORMALDEHYDE INACTIVATED) 15; 15; 15; 15 UG/.5ML; UG/.5ML; UG/.5ML; UG/.5ML
0.5 INJECTION, SUSPENSION INTRAMUSCULAR
Qty: 0.5 ML | Refills: 0 | OUTPATIENT
Start: 2022-10-14 | End: 2022-10-16

## 2023-09-25 ENCOUNTER — PHYSICAL THERAPY (OUTPATIENT)
Dept: PHYSICAL THERAPY | Facility: REHABILITATION | Age: 63
End: 2023-09-25
Attending: FAMILY MEDICINE
Payer: COMMERCIAL

## 2023-09-25 DIAGNOSIS — M54.50 LOW BACK PAIN, UNSPECIFIED BACK PAIN LATERALITY, UNSPECIFIED CHRONICITY, UNSPECIFIED WHETHER SCIATICA PRESENT: ICD-10-CM

## 2023-09-25 PROCEDURE — 97110 THERAPEUTIC EXERCISES: CPT | Performed by: PHYSICAL THERAPIST

## 2023-09-25 PROCEDURE — 97162 PT EVAL MOD COMPLEX 30 MIN: CPT | Performed by: PHYSICAL THERAPIST

## 2023-09-25 SDOH — ECONOMIC STABILITY: GENERAL: QUALITY OF LIFE: GOOD

## 2023-09-25 ASSESSMENT — ENCOUNTER SYMPTOMS
PAIN TIMING: INTERMITTENT
EXACERBATED BY: STRESS
EXACERBATED BY: ACTIVITY
QUALITY: ACHING
EXACERBATED BY: BENDING
EXACERBATED BY: CARRYING
ALLEVIATING FACTORS: REST
PAIN SCALE: 4
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
## 2023-10-02 ENCOUNTER — PHYSICAL THERAPY (OUTPATIENT)
Dept: PHYSICAL THERAPY | Facility: REHABILITATION | Age: 63
End: 2023-10-02
Attending: FAMILY MEDICINE
Payer: COMMERCIAL

## 2023-10-02 DIAGNOSIS — M54.50 LOW BACK PAIN, UNSPECIFIED BACK PAIN LATERALITY, UNSPECIFIED CHRONICITY, UNSPECIFIED WHETHER SCIATICA PRESENT: ICD-10-CM

## 2023-10-02 PROCEDURE — 97140 MANUAL THERAPY 1/> REGIONS: CPT | Performed by: PHYSICAL THERAPIST

## 2023-10-02 PROCEDURE — 97112 NEUROMUSCULAR REEDUCATION: CPT | Performed by: PHYSICAL THERAPIST

## 2023-10-02 NOTE — OP THERAPY DAILY TREATMENT
"  Outpatient Physical Therapy  DAILY TREATMENT     [facility] [place]  [address], [address]  [facility] [place] [facility] [address]  Phone:  [phone]  Fax:  [phone]    Date: 10/02/2023    Patient: Carolina Grider  YOB: 1960  MRN: [id number]     Time Calculation    Start time: 1445  Stop time: 1525 Time Calculation (min): 40 minutes         Chief Complaint: Back Problem    Visit #: 2    SUBJECTIVE:  Patient reports that she tried her home exercises and didn't have any issues with them. Patient reports mild discomfort in anterior thigh after shuttle squats. She reports that she has something similar at home she can use that is for pilates.    OBJECTIVE:  Current objective measures: Patient seen for first time followup with presentation consistent with initial evaluation.          Therapeutic Exercises (CPT 45227):     1. NuStep w/u, 6'    2. Quad stretch, 1' ea in SL    3. Open book, x15ea    4. HL TrA + ball press, x20 +5\"    5. Neutral pelvic tilt seated on therapy ball, 2'    6. Stir the pot, Green tubing x1' ea    7. Straight arm pulldown, Green tubing 2x15, In tandem      Therapeutic Exercise Summary: HEP: Hip flexor stretch, lower trunk rotation, piriformis seated stretch, TA bracing, supine marching, standing hip abduction, sit to stand    Therapeutic Treatments and Modalities:     1. Manual Therapy (CPT 24949)    Therapeutic Treatment and Modalities Summary: Manual:  Traction, axial hip distraction, piriformis stretch, quad stretch, STM     Time-based treatments/modalities:    Physical Therapy Timed Treatment Charges  Manual therapy minutes (CPT 50965): 10 minutes  Neuromusc re-ed, balance, coor, post minutes (CPT 49906): 30 minutes    ASSESSMENT:   Response to treatment: Patient seen for first time follow up. Good initial response to therapy. Patient required min verbal and moderate tactile cueing for lumbopelvic proprioception and postural control.    PLAN/RECOMMENDATIONS: "   Plan for treatment: therapy treatment to continue next visit.  Planned interventions for next visit: continue with current treatment.

## 2023-10-09 ENCOUNTER — PHYSICAL THERAPY (OUTPATIENT)
Dept: PHYSICAL THERAPY | Facility: REHABILITATION | Age: 63
End: 2023-10-09
Attending: FAMILY MEDICINE
Payer: COMMERCIAL

## 2023-10-09 DIAGNOSIS — M54.50 LOW BACK PAIN, UNSPECIFIED BACK PAIN LATERALITY, UNSPECIFIED CHRONICITY, UNSPECIFIED WHETHER SCIATICA PRESENT: ICD-10-CM

## 2023-10-09 PROCEDURE — 97110 THERAPEUTIC EXERCISES: CPT | Performed by: PHYSICAL THERAPIST

## 2023-10-09 PROCEDURE — 97112 NEUROMUSCULAR REEDUCATION: CPT | Performed by: PHYSICAL THERAPIST

## 2023-10-09 NOTE — OP THERAPY DAILY TREATMENT
"  Outpatient Physical Therapy  DAILY TREATMENT     [facility] [place]  [address], [address]  [place] [address]  Phone:  [phone]  Fax:  [phone]    Date: 10/09/2023    Patient: Carolina Grider  YOB: 1960  MRN: [id number]     Time Calculation    Start time: 1400  Stop time: 1440 Time Calculation (min): 40 minutes         Chief Complaint: Back Problem    Visit #: 3    SUBJECTIVE:  Patient reports continued improvement but states that her low back is a little more sore than usual because she was doing yard work and housework all over the weekend.     OBJECTIVE:  Current objective measures: Patient required moderate verbal cueing to maintain TrA activation and lumbopelvic postural control          Therapeutic Exercises (CPT 04578):     1. NuStep w/u, 6'    2. Quad stretch, 1' ea in SL, NT    3. Open book, x15ea, NT    4. HL TrA + ball press, x20 +5\", NT    6. Stir the pot, Green tubing x1' ea, NT    7. Straight arm pulldown, Blue tubing x15 ea, In tandem    8. Shuttle, 5 cords DL 2', SL 3 cords, 1' ea    9. FWD step up    10. Split stance FWD weight shift on 8: step, x15\"1\"    11. 3 way SLR, x10 ea    12. QP hip diagonal, 2x10 ea    13. Seated SB balance, 2'      Therapeutic Exercise Summary: HEP: Hip flexor stretch, lower trunk rotation, piriformis seated stretch, TA bracing, supine marching, standing hip abduction, sit to stand    Therapeutic Treatments and Modalities:     1. Manual Therapy (CPT 37739)    Therapeutic Treatment and Modalities Summary: Manual:  Traction, axial hip distraction, piriformis stretch, quad stretch, STM     Time-based treatments/modalities:    Physical Therapy Timed Treatment Charges  Neuromusc re-ed, balance, coor, post minutes (CPT 25493): 15 minutes  Therapeutic exercise minutes (CPT 56663): 25 minutes    ASSESSMENT:   Response to treatment: Carolina is showing good incremental overall improvement. She continues to present with hip abductor " and abdominal strength deficit (especially TrA) which is impacting her balance and core stability with dynamic exercises like steps and stairs.    PLAN/RECOMMENDATIONS:   Plan for treatment: therapy treatment to continue next visit.  Planned interventions for next visit: continue with current treatment.

## 2023-10-10 ENCOUNTER — PHARMACY VISIT (OUTPATIENT)
Dept: PHARMACY | Facility: MEDICAL CENTER | Age: 63
End: 2023-10-10
Payer: COMMERCIAL

## 2023-10-10 PROCEDURE — RXMED WILLOW AMBULATORY MEDICATION CHARGE: Performed by: INTERNAL MEDICINE

## 2023-10-10 RX ORDER — INFLUENZA A VIRUS A/BRISBANE/02/2018 IVR-190 (H1N1) ANTIGEN (FORMALDEHYDE INACTIVATED), INFLUENZA A VIRUS A/KANSAS/14/2017 X-327 (H3N2) ANTIGEN (FORMALDEHYDE INACTIVATED), INFLUENZA B VIRUS B/PHUKET/3073/2013 ANTIGEN (FORMALDEHYDE INACTIVATED), AND INFLUENZA B VIRUS B/MARYLAND/15/2016 BX-69A ANTIGEN (FORMALDEHYDE INACTIVATED) 15; 15; 15; 15 UG/.5ML; UG/.5ML; UG/.5ML; UG/.5ML
INJECTION, SUSPENSION INTRAMUSCULAR
Qty: 0.5 ML | Refills: 0 | OUTPATIENT
Start: 2023-10-10

## 2023-12-04 ENCOUNTER — HOSPITAL ENCOUNTER (OUTPATIENT)
Dept: LAB | Facility: MEDICAL CENTER | Age: 63
End: 2023-12-04
Attending: FAMILY MEDICINE
Payer: COMMERCIAL

## 2023-12-04 ENCOUNTER — APPOINTMENT (OUTPATIENT)
Dept: PHYSICAL THERAPY | Facility: REHABILITATION | Age: 63
End: 2023-12-04
Payer: COMMERCIAL

## 2023-12-04 ENCOUNTER — APPOINTMENT (OUTPATIENT)
Dept: PHYSICAL THERAPY | Facility: REHABILITATION | Age: 63
End: 2023-12-04
Attending: FAMILY MEDICINE
Payer: COMMERCIAL

## 2023-12-04 LAB
ALBUMIN SERPL BCP-MCNC: 4.5 G/DL (ref 3.2–4.9)
ALBUMIN/GLOB SERPL: 1.8 G/DL
ALP SERPL-CCNC: 54 U/L (ref 30–99)
ALT SERPL-CCNC: 17 U/L (ref 2–50)
ANION GAP SERPL CALC-SCNC: 11 MMOL/L (ref 7–16)
AST SERPL-CCNC: 12 U/L (ref 12–45)
BILIRUB SERPL-MCNC: 0.3 MG/DL (ref 0.1–1.5)
BUN SERPL-MCNC: 21 MG/DL (ref 8–22)
CALCIUM ALBUM COR SERPL-MCNC: 8.7 MG/DL (ref 8.5–10.5)
CALCIUM SERPL-MCNC: 9.1 MG/DL (ref 8.5–10.5)
CHLORIDE SERPL-SCNC: 107 MMOL/L (ref 96–112)
CHOLEST SERPL-MCNC: 266 MG/DL (ref 100–199)
CO2 SERPL-SCNC: 22 MMOL/L (ref 20–33)
CREAT SERPL-MCNC: 0.55 MG/DL (ref 0.5–1.4)
FASTING STATUS PATIENT QL REPORTED: NORMAL
GFR SERPLBLD CREATININE-BSD FMLA CKD-EPI: 103 ML/MIN/1.73 M 2
GLOBULIN SER CALC-MCNC: 2.5 G/DL (ref 1.9–3.5)
GLUCOSE SERPL-MCNC: 103 MG/DL (ref 65–99)
HDLC SERPL-MCNC: 73 MG/DL
LDLC SERPL CALC-MCNC: 175 MG/DL
POTASSIUM SERPL-SCNC: 4.3 MMOL/L (ref 3.6–5.5)
PROT SERPL-MCNC: 7 G/DL (ref 6–8.2)
SODIUM SERPL-SCNC: 140 MMOL/L (ref 135–145)
TRIGL SERPL-MCNC: 92 MG/DL (ref 0–149)

## 2023-12-04 PROCEDURE — 80053 COMPREHEN METABOLIC PANEL: CPT

## 2023-12-04 PROCEDURE — 36415 COLL VENOUS BLD VENIPUNCTURE: CPT

## 2023-12-04 PROCEDURE — 80061 LIPID PANEL: CPT

## 2023-12-11 ENCOUNTER — APPOINTMENT (OUTPATIENT)
Dept: PHYSICAL THERAPY | Facility: REHABILITATION | Age: 63
End: 2023-12-11
Attending: FAMILY MEDICINE
Payer: COMMERCIAL

## 2024-01-22 ENCOUNTER — TELEPHONE (OUTPATIENT)
Dept: PHYSICAL THERAPY | Facility: REHABILITATION | Age: 64
End: 2024-01-22
Payer: COMMERCIAL

## 2024-01-22 NOTE — OP THERAPY DISCHARGE SUMMARY
Outpatient Physical Therapy  DISCHARGE SUMMARY NOTE      [facility] [place]  [address]  [place] [address]  Phone:  [phone]  Fax:  [phone]    Date of Visit: 01/22/2024    Patient: Carolina Grider  YOB: 1960  MRN: [id number]     Referring Provider: Suellen Barrow M.D.  [address]  [facility] [place] [address]      Referring Diagnosis Low back pain, unspecified [M54.50]            Functional Assessment Used        Your patient is being discharged from Physical Therapy with the following comments:   Patient has failed to schedule or reschedule follow-up visits    Comments:  Patient has not been in contact with clinic since October 2023 and is assumed to no longer require PT services     Limitations Remaining:  NA    Recommendations:  Discharge    Landry Mitchell    Date: 1/22/2024

## 2024-10-08 ENCOUNTER — PHARMACY VISIT (OUTPATIENT)
Dept: PHARMACY | Facility: MEDICAL CENTER | Age: 64
End: 2024-10-08
Payer: COMMERCIAL

## 2024-10-08 PROCEDURE — RXMED WILLOW AMBULATORY MEDICATION CHARGE: Performed by: INTERNAL MEDICINE

## 2024-10-08 RX ORDER — INFLUENZA A VIRUS A/VICTORIA/4897/2022 IVR-238 (H1N1) ANTIGEN (FORMALDEHYDE INACTIVATED), INFLUENZA A VIRUS A/CALIFORNIA/122/2022 SAN-022 (H3N2) ANTIGEN (FORMALDEHYDE INACTIVATED), AND INFLUENZA B VIRUS B/MICHIGAN/01/2021 ANTIGEN (FORMALDEHYDE INACTIVATED) 15; 15; 15 MG/.5ML; MG/.5ML; MG/.5ML
INJECTION, SUSPENSION INTRAMUSCULAR
Qty: 0.5 ML | Refills: 0 | OUTPATIENT
Start: 2024-10-08

## 2025-03-03 ENCOUNTER — HOSPITAL ENCOUNTER (OUTPATIENT)
Dept: LAB | Facility: MEDICAL CENTER | Age: 65
End: 2025-03-03
Attending: FAMILY MEDICINE
Payer: COMMERCIAL

## 2025-03-03 LAB
ALBUMIN SERPL BCP-MCNC: 4.5 G/DL (ref 3.2–4.9)
ALBUMIN/GLOB SERPL: 1.5 G/DL
ALP SERPL-CCNC: 59 U/L (ref 30–99)
ALT SERPL-CCNC: 16 U/L (ref 2–50)
ANION GAP SERPL CALC-SCNC: 13 MMOL/L (ref 7–16)
AST SERPL-CCNC: 15 U/L (ref 12–45)
BILIRUB SERPL-MCNC: 0.4 MG/DL (ref 0.1–1.5)
BUN SERPL-MCNC: 15 MG/DL (ref 8–22)
CALCIUM ALBUM COR SERPL-MCNC: 9.2 MG/DL (ref 8.5–10.5)
CALCIUM SERPL-MCNC: 9.6 MG/DL (ref 8.5–10.5)
CHLORIDE SERPL-SCNC: 103 MMOL/L (ref 96–112)
CO2 SERPL-SCNC: 22 MMOL/L (ref 20–33)
CREAT SERPL-MCNC: 0.6 MG/DL (ref 0.5–1.4)
GFR SERPLBLD CREATININE-BSD FMLA CKD-EPI: 100 ML/MIN/1.73 M 2
GLOBULIN SER CALC-MCNC: 3 G/DL (ref 1.9–3.5)
GLUCOSE SERPL-MCNC: 101 MG/DL (ref 65–99)
POTASSIUM SERPL-SCNC: 3.8 MMOL/L (ref 3.6–5.5)
PROT SERPL-MCNC: 7.5 G/DL (ref 6–8.2)
SODIUM SERPL-SCNC: 138 MMOL/L (ref 135–145)

## 2025-03-03 PROCEDURE — 80053 COMPREHEN METABOLIC PANEL: CPT

## 2025-03-03 PROCEDURE — 36415 COLL VENOUS BLD VENIPUNCTURE: CPT

## 2025-03-03 PROCEDURE — 83704 LIPOPROTEIN BLD QUAN PART: CPT

## 2025-03-03 PROCEDURE — 80061 LIPID PANEL: CPT

## 2025-03-09 LAB
CHOLEST SERPL-MCNC: 314 MG/DL
HDL PARTICAL NO Q4363: >41 UMOL/L
HDL SIZE Q4361: 9.6 NM
HDLC SERPL-MCNC: 76 MG/DL (ref 40–59)
HLD.LARGE SERPL-SCNC: 11.8 UMOL/L
L VLDL PART NO Q4357: 6.4 NMOL/L
LDL SERPL QN: 22.2 NM
LDL SERPL-SCNC: 1895 NMOL/L
LDL SMALL SERPL-SCNC: 469 NMOL/L
LDLC SERPL CALC-MCNC: 209 MG/DL
PATHOLOGY STUDY: ABNORMAL
TRIGL SERPL-MCNC: 146 MG/DL (ref 30–149)
VLDL SIZE Q4362: 52.8 NM